# Patient Record
Sex: FEMALE | Race: OTHER | NOT HISPANIC OR LATINO | URBAN - METROPOLITAN AREA
[De-identification: names, ages, dates, MRNs, and addresses within clinical notes are randomized per-mention and may not be internally consistent; named-entity substitution may affect disease eponyms.]

---

## 2018-07-21 ENCOUNTER — EMERGENCY (EMERGENCY)
Facility: HOSPITAL | Age: 28
LOS: 0 days | Discharge: HOME | End: 2018-07-21
Admitting: PHYSICIAN ASSISTANT

## 2018-07-21 VITALS
SYSTOLIC BLOOD PRESSURE: 121 MMHG | RESPIRATION RATE: 18 BRPM | DIASTOLIC BLOOD PRESSURE: 61 MMHG | TEMPERATURE: 99 F | OXYGEN SATURATION: 99 % | HEART RATE: 110 BPM

## 2018-07-21 VITALS — RESPIRATION RATE: 18 BRPM | OXYGEN SATURATION: 99 % | HEART RATE: 96 BPM

## 2018-07-21 DIAGNOSIS — R05 COUGH: ICD-10-CM

## 2018-07-21 DIAGNOSIS — J45.909 UNSPECIFIED ASTHMA, UNCOMPLICATED: ICD-10-CM

## 2018-07-21 RX ORDER — AZITHROMYCIN 500 MG/1
1 TABLET, FILM COATED ORAL
Qty: 5 | Refills: 0 | OUTPATIENT
Start: 2018-07-21 | End: 2018-07-25

## 2018-07-21 RX ORDER — IPRATROPIUM/ALBUTEROL SULFATE 18-103MCG
3 AEROSOL WITH ADAPTER (GRAM) INHALATION ONCE
Qty: 0 | Refills: 0 | Status: COMPLETED | OUTPATIENT
Start: 2018-07-21 | End: 2018-07-21

## 2018-07-21 RX ORDER — ALBUTEROL 90 UG/1
1 AEROSOL, METERED ORAL ONCE
Qty: 0 | Refills: 0 | Status: COMPLETED | OUTPATIENT
Start: 2018-07-21 | End: 2018-07-21

## 2018-07-21 RX ADMIN — Medication 3 MILLILITER(S): at 13:40

## 2018-07-21 RX ADMIN — Medication 3 MILLILITER(S): at 14:29

## 2018-07-21 RX ADMIN — ALBUTEROL 1 PUFF(S): 90 AEROSOL, METERED ORAL at 14:40

## 2018-07-21 NOTE — ED PROVIDER NOTE - PHYSICAL EXAMINATION
CONST: Well appearing in NAD  EYES: PERRL, EOMI, Sclera and conjunctiva clear.   ENT: No nasal discharge. TM's clear B/L without drainage. Oropharynx normal appearing, no erythema or exudates. Uvula midline.  NECK: Non-tender, no meningeal signs  CARD: Normal S1 S2; Normal rate and rhythm  RESP: Equal BS B/L, No distress. Mild wheezing BL with crackles on L

## 2018-07-21 NOTE — ED PROVIDER NOTE - NS ED ROS FT
CONST: No fever, chills or bodyaches  EYES: No pain, redness, drainage or visual changes.  ENT: No ear pain or discharge, nasal discharge or congestion. No sore throat  CARD: No chest pain, palpitations  RESP: No SOB or hemoptysis. +Cough  NEURO: No headache, dizziness, paresthesias or LOC

## 2018-07-21 NOTE — ED PROVIDER NOTE - OBJECTIVE STATEMENT
Pt is a 29 yo F c/o cough x 4 days. Pt sts she feels mucous in her chest but is unable to cough it up. Sts cough has been keeping her up at night. Pt not a smoker, hx of asthma as a child. No fever, chills, chest pain or SOB. No leg pain or swelling. No recent travel. No rhinorrhea or nasal congestion. Pt has tried only natural remedies at home, no OTC meds.

## 2021-06-23 ENCOUNTER — EMERGENCY (EMERGENCY)
Facility: HOSPITAL | Age: 31
LOS: 0 days | Discharge: HOME | End: 2021-06-23
Attending: EMERGENCY MEDICINE | Admitting: EMERGENCY MEDICINE
Payer: MEDICAID

## 2021-06-23 VITALS
WEIGHT: 153 LBS | OXYGEN SATURATION: 99 % | TEMPERATURE: 97 F | RESPIRATION RATE: 17 BRPM | DIASTOLIC BLOOD PRESSURE: 70 MMHG | HEART RATE: 62 BPM | SYSTOLIC BLOOD PRESSURE: 142 MMHG

## 2021-06-23 DIAGNOSIS — K08.89 OTHER SPECIFIED DISORDERS OF TEETH AND SUPPORTING STRUCTURES: ICD-10-CM

## 2021-06-23 DIAGNOSIS — K02.9 DENTAL CARIES, UNSPECIFIED: ICD-10-CM

## 2021-06-23 PROCEDURE — 99282 EMERGENCY DEPT VISIT SF MDM: CPT

## 2021-06-23 NOTE — CONSULT NOTE ADULT - SUBJECTIVE AND OBJECTIVE BOX
Patient is a 31y old  Female who presents with a chief complaint of upper left dental pain.     HPI: Patient has been experiencing throbbing pain for a couple of days.       PAST MEDICAL & SURGICAL HISTORY:  No pertinent past medical history    No significant past surgical history      ( -  ) heart valve replacement  (  - ) joint replacement  ( -  ) pregnancy    MEDICATIONS  (STANDING): none    MEDICATIONS  (PRN):  none    Allergies    No Known Allergies    Intolerances        FAMILY HISTORY:      *SOCIAL HISTORY: ( -  ) Tobacco; (  - ) ETOH        Vital Signs Last 24 Hrs  T(C): 36.1 (23 Jun 2021 07:13), Max: 36.1 (23 Jun 2021 07:13)  T(F): 97 (23 Jun 2021 07:13), Max: 97 (23 Jun 2021 07:13)  HR: 62 (23 Jun 2021 07:13) (62 - 62)  BP: 142/70 (23 Jun 2021 07:13) (142/70 - 142/70)  BP(mean): --128/62  RR: 17 (23 Jun 2021 07:13) (17 - 17)  SpO2: 99% (23 Jun 2021 07:13) (99% - 99%)    LABS:                  EOE:  TMJ ( -  ) clicks                     (  - ) pops                     ( -  ) crepitus                        (  - ) trismus             (  - ) lymphadenopathy             (  - ) swelling             (  - ) asymmetry             (  - ) palpation             ( -  ) dyspnea             ( -  ) dysphagia             ( -  ) loss of consciousness    IOE:  permanent dentition, multiple carious teeth           hard/soft palate: No pathology noted           tongue/FOM: No pathology noted           labial/buccal mucosa: No pathology noted           ( +  ) percussion #14           (  - ) palpation           ( -  ) swelling            ( -  ) abscess           ( -  ) sinus tract        *DENTAL RADIOGRAPHS: 1 periapical radiograph taken, #14 grossly decayed      *ASSESSMENT: Patient reports pain on upper left for a couple of days. #14 grossly decayed with periapical pathology, tenderness to percussion. No swelling, no abscess noted. Tooth #14 has poor prognosis. Risks and benefits explained to patient as per OS sheet dated 07/13/00. Consent obtained. Side site completed. Administered 2 carpules 4% Septocaine 1:100K epinephrine via buccal and palatal infiltration. Tooth extracted with surgical handpiece, elevators and forceps. Socket curetted and irrigated. Hemostasis achieved. Post op x-ray taken, negative.     RECOMMENDATIONS:  1) Amoxicillin 500mg dfisp 21 q8h for 7 days. Ibuprofen 600mg as needed.   2) Dental F/U with outpatient dentist for comprehensive dental care.   3) If any difficulty swallowing/breathing, fever occur, return to ER.     Leah Son DDS pager #[phone]

## 2021-06-23 NOTE — ED PROVIDER NOTE - PHYSICAL EXAMINATION
Physical Exam    Vital Signs: I have reviewed the initial vital signs.  Constitutional: well-nourished, appears stated age, no acute distress  Dental: + pain to tooth #14  Neuro: AOx3, No focal deficits noted

## 2021-06-23 NOTE — ED ADULT NURSE NOTE - NSIMPLEMENTINTERV_GEN_ALL_ED
Implemented All Universal Safety Interventions:  [place] to call system. Call bell, personal items and telephone within reach. Instruct patient to call for assistance. Room bathroom lighting operational. Non-slip footwear when patient is off stretcher. Physically safe environment: no spills, clutter or unnecessary equipment. Stretcher in lowest position, wheels locked, appropriate side rails in place.

## 2021-06-23 NOTE — ED PROVIDER NOTE - ATTENDING CONTRIBUTION TO CARE
left upper dental pain tooth 14 x1 day, prior history of filling that fell out. plan is to transfer to dental for further management.

## 2021-06-23 NOTE — ED PROVIDER NOTE - OBJECTIVE STATEMENT
32 yo F c/o left upper dental pain since  yesterday. Filling in that tooth fell out  during covid but she didn't follow up. No fever.

## 2022-05-09 ENCOUNTER — EMERGENCY (EMERGENCY)
Facility: HOSPITAL | Age: 32
LOS: 0 days | Discharge: HOME | End: 2022-05-09
Attending: STUDENT IN AN ORGANIZED HEALTH CARE EDUCATION/TRAINING PROGRAM | Admitting: STUDENT IN AN ORGANIZED HEALTH CARE EDUCATION/TRAINING PROGRAM
Payer: MEDICAID

## 2022-05-09 VITALS
SYSTOLIC BLOOD PRESSURE: 113 MMHG | TEMPERATURE: 98 F | OXYGEN SATURATION: 99 % | RESPIRATION RATE: 18 BRPM | DIASTOLIC BLOOD PRESSURE: 70 MMHG | HEART RATE: 78 BPM

## 2022-05-09 PROCEDURE — 99284 EMERGENCY DEPT VISIT MOD MDM: CPT

## 2022-05-09 NOTE — ED PROVIDER NOTE - CLINICAL SUMMARY MEDICAL DECISION MAKING FREE TEXT BOX
31 year old female no pmh presents here c/o right tooth pain x 1 day. States she lost her filling a few days ago but began having pain yesterday which improved after 500mg tylenol. LMP 4/21 No fever chills sob. VS reviewed patient offered pain medication however did not want. Patient sent to dental clinic for further evaluation.

## 2022-05-09 NOTE — CONSULT NOTE ADULT - SUBJECTIVE AND OBJECTIVE BOX
Patient is a 31y old  Female who presents with a chief complaint of upper right dental pain.    HPI: Patient reports dental pain in upper right quadrant. Patient states she has a private dentist and was told she needs an oral surgeon for any necessary extractions.      PAST MEDICAL & SURGICAL HISTORY:  No pertinent past medical history    No significant past surgical history      ( -  ) heart valve replacement  ( -  ) joint replacement  ( -  ) pregnancy    MEDICATIONS  (STANDING): Denies    MEDICATIONS  (PRN): Tylenol      Allergies  No Known Allergies          FAMILY HISTORY:      *SOCIAL HISTORY: ( -  ) Tobacco; (-  ) ETOH    *Last Dental Visit: Last week    Vital Signs Last 24 Hrs  T(C): 36.7 (09 May 2022 09:02), Max: 36.7 (09 May 2022 09:02)  T(F): 98 (09 May 2022 09:02), Max: 98 (09 May 2022 09:02)  HR: 78 (09 May 2022 09:02) (78 - 78)  BP: 113/70 (09 May 2022 09:02) (113/70 - 113/70)  BP(mean): --  RR: 18 (09 May 2022 09:02) (18 - 18)  SpO2: 99% (09 May 2022 09:02) (99% - 99%)      EOE:  TMJ ( -  ) clicks                     (  - ) pops                     ( -  ) crepitus             Mandible <<FROM>>             Facial bones and MOM <<grossly intact>>             ( -  ) trismus             ( -  ) lymphadenopathy             ( -  ) swelling             ( -  ) asymmetry             (  - ) palpation             (   -) dyspnea             (  - ) dysphagia             (  - ) loss of consciousness    IOE:  <<permanentdentition:  <<multiple carious teeth>>            hard/soft palate:  ( -  ) palatal torus, <<No pathology noted>>           tongue/FOM <<No pathology noted>>           labial/buccal mucosa <<No pathology noted>>           ( +  ) percussion: Hypersensitive #2, 3, 29           ( -  ) palpation           ( -  ) swelling            ( -  ) abscess           (  - ) sinus tract    Dentition present: <<yes  >>  Mobility: <<no  >>  Caries: <<  yes >>         *DENTAL RADIOGRAPHS: 1 Periapical #2    *ASSESSMENT: Clinical exam performed. No swelling, sinus tract or lymphadenopathy. Tooth #2 displays gross caries to alveolar bone level and deemed nonrestorable due to extent of caries. Tooth #3 displays distal decay in close proximity to pulp chamber. Patient advised that #3 will require a root canal, post/core and crown to restore.       *PLAN: Extraction #2. Patient stated she will follow-up with private dentist for further treatment.     PROCEDURE:   Verbal and written consent given. Treatment consequences explained to patient as per OS sheet dated 07/13/00. Side/site verified. Consent obtained.  Anesthesia: 3 carpules of 4% Septocaine (1:100,000 epinephrine) via local infiltration of #2  Treatment: Tooth #2 extracted surgically with surgical handpiece. No trauma of adjacent tooth #3 and patient was made aware that the tooth was not affected during the extraction. Irrigation with saline, socket curetted. Post-operative periapical taken and confirmed. Instructions given to patient both written and verbal. Patient stable upon release.     RECOMMENDATIONS:  1) 500 mg Amoxicillin q8h x 7 days  2) 600 mg Ibuprofen q6h x 5 days  3) Dental F/U with outpatient dentist for comprehensive dental care.   4) If any difficulty swallowing/breathing, fever occur, return to ER.       Merline Dasilva, TALYA  Pager #[phone]

## 2022-05-09 NOTE — ED PROVIDER NOTE - NS ED ATTENDING STATEMENT MOD
This was a shared visit with the [facility]. I reviewed and verified the documentation and independently performed the documented:

## 2022-05-09 NOTE — ED PROVIDER NOTE - ATTENDING APP SHARED VISIT CONTRIBUTION OF CARE
31 year old female no pmh presents here c/o right tooth pain x 1 day. States she lost her filling a few days ago but began having pain yesterday which improved after 500mg tylenol. LMP 4/21 No fever chills sob.  on exam  CONSTITUTIONAL: WA / WN / NAD  HEAD: NCAT  EYES: PERRL; EOMI;   ENT: Normal pharynx; mucous membranes pink/moist, no erythema. airway patent + ttp tooth #31 with swelling to the gums.   NECK: Supple;   SKIN: Warm and dry;   NEURO: AAOx3  PSYCH: Memory Intact, Normal Affect

## 2022-05-09 NOTE — ED ADULT NURSE NOTE - NSIMPLEMENTINTERV_GEN_ALL_ED
Implemented All Universal Safety Interventions:  [place] to call system. Call bell, personal items and telephone within reach. Instruct patient to call for assistance. Room bathroom lighting operational. Non-slip footwear when patient is off stretcher. Physically safe environment: no spills, clutter or unnecessary equipment. Stretcher in lowest position, wheels locked, appropriate side rails in place.

## 2022-05-09 NOTE — ED PROVIDER NOTE - OBJECTIVE STATEMENT
32 y/o female presents to the ED c/o "I have a whole in my right upper tooth and the filling fell out of my right lower tooth. I have a lot of pain on the right side. I took tylenol last night." no fever/ chills/ sore throat

## 2022-05-10 DIAGNOSIS — K08.89 OTHER SPECIFIED DISORDERS OF TEETH AND SUPPORTING STRUCTURES: ICD-10-CM

## 2022-05-10 DIAGNOSIS — K02.9 DENTAL CARIES, UNSPECIFIED: ICD-10-CM

## 2023-08-07 NOTE — ED PROVIDER NOTE - IV ALTEPLASE INCLUSION HIDDEN
Tylenol and motrin alternating with eachother every 3 hours
show
Probably hypoproliferative with work up within the last year mostly unremarkable: EGD, colonoscopy, pill endoscopy, SPEP, ZOLTAN, Hg Electrophoresis. Possibly combination of YUAN and AOCD 2/2 to kidney issue. She denies any bleeding issues. Symptoms chronic in nature (fatigue, decrease exercise tolerance)    -Transfuse two units pRBC & check post-transfusion Hg  -Send iron studies and ferritin to check stores (though will be reflective of two units pRBC)

## 2024-02-02 NOTE — ED ADULT TRIAGE NOTE - ARRIVAL FROM
- above goal today likely secondary to decongestant.   - advised no congestants  - continues losartan-HCTZ 100-12.5mg daily    Home